# Patient Record
Sex: MALE | Race: ASIAN | NOT HISPANIC OR LATINO | Employment: OTHER | URBAN - METROPOLITAN AREA
[De-identification: names, ages, dates, MRNs, and addresses within clinical notes are randomized per-mention and may not be internally consistent; named-entity substitution may affect disease eponyms.]

---

## 2017-01-05 ENCOUNTER — HOSPITAL ENCOUNTER (EMERGENCY)
Facility: HOSPITAL | Age: 52
Discharge: HOME | End: 2017-01-05
Attending: EMERGENCY MEDICINE
Payer: COMMERCIAL

## 2017-01-05 VITALS
WEIGHT: 180 LBS | BODY MASS INDEX: 26.66 KG/M2 | RESPIRATION RATE: 16 BRPM | HEIGHT: 69 IN | HEART RATE: 68 BPM | SYSTOLIC BLOOD PRESSURE: 130 MMHG | DIASTOLIC BLOOD PRESSURE: 88 MMHG | TEMPERATURE: 98 F | OXYGEN SATURATION: 99 %

## 2017-01-05 DIAGNOSIS — S61.219A LACERATION OF FINGER, RIGHT, INITIAL ENCOUNTER: ICD-10-CM

## 2017-01-05 DIAGNOSIS — S62.609B: Primary | ICD-10-CM

## 2017-01-05 PROCEDURE — 63600175 PHARM REV CODE 636 W HCPCS: Performed by: PHYSICIAN ASSISTANT

## 2017-01-05 PROCEDURE — 90715 TDAP VACCINE 7 YRS/> IM: CPT | Performed by: PHYSICIAN ASSISTANT

## 2017-01-05 PROCEDURE — 25000003 PHARM REV CODE 250: Performed by: PHYSICIAN ASSISTANT

## 2017-01-05 PROCEDURE — 12041 INTMD RPR N-HF/GENIT 2.5CM/<: CPT

## 2017-01-05 PROCEDURE — 90471 IMMUNIZATION ADMIN: CPT | Performed by: PHYSICIAN ASSISTANT

## 2017-01-05 PROCEDURE — 99283 EMERGENCY DEPT VISIT LOW MDM: CPT | Mod: 25

## 2017-01-05 RX ORDER — LIDOCAINE HYDROCHLORIDE 10 MG/ML
10 INJECTION INFILTRATION; PERINEURAL
Status: COMPLETED | OUTPATIENT
Start: 2017-01-05 | End: 2017-01-05

## 2017-01-05 RX ORDER — AMOXICILLIN AND CLAVULANATE POTASSIUM 875; 125 MG/1; MG/1
1 TABLET, FILM COATED ORAL 2 TIMES DAILY
Qty: 20 TABLET | Refills: 0 | Status: SHIPPED | OUTPATIENT
Start: 2017-01-05 | End: 2017-01-15

## 2017-01-05 RX ORDER — DOXYCYCLINE 100 MG/1
100 CAPSULE ORAL 2 TIMES DAILY
Qty: 20 CAPSULE | Refills: 0 | Status: SHIPPED | OUTPATIENT
Start: 2017-01-05 | End: 2017-01-15

## 2017-01-05 RX ADMIN — CLOSTRIDIUM TETANI TOXOID ANTIGEN (FORMALDEHYDE INACTIVATED), CORYNEBACTERIUM DIPHTHERIAE TOXOID ANTIGEN (FORMALDEHYDE INACTIVATED), BORDETELLA PERTUSSIS TOXOID ANTIGEN (GLUTARALDEHYDE INACTIVATED), BORDETELLA PERTUSSIS FILAMENTOUS HEMAGGLUTININ ANTIGEN (FORMALDEHYDE INACTIVATED), BORDETELLA PERTUSSIS PERTACTIN ANTIGEN, AND BORDETELLA PERTUSSIS FIMBRIAE 2/3 ANTIGEN 0.5 ML: 5; 2; 2.5; 5; 3; 5 INJECTION, SUSPENSION INTRAMUSCULAR at 06:01

## 2017-01-05 RX ADMIN — LIDOCAINE HYDROCHLORIDE 10 ML: 10 INJECTION, SOLUTION INFILTRATION; PERINEURAL at 06:01

## 2017-01-05 RX ADMIN — BACITRACIN, NEOMYCIN, POLYMYXIN B 1 EACH: 400; 3.5; 5 OINTMENT TOPICAL at 06:01

## 2017-01-05 NOTE — ED TRIAGE NOTES
Motor fell on right hand and smashed very heavy motor dressing in place lacerated finger bleeding controlled hand numb

## 2017-01-05 NOTE — ED AVS SNAPSHOT
[facility]  [address] [place] [address]               Hung Keegan Colon   2017  5:37 PM   ED    Description:  Male : 1965   Department:  [facility]           Your Care was Coordinated By:     Provider Role From To    Kyung Rascon MD Attending Provider [phone] --    Sancho Washburn PA-C Physician Assistant [phone] --      Reason for Visit     Laceration           Diagnoses this Visit        Comments    Fracture of finger of right hand, open, initial encounter    -  Primary     Laceration of finger, right, initial encounter           ED Disposition     None           To Do List           Follow-up Information     Follow up with Dilia Collins III, MD. Schedule an appointment as soon as possible for a visit in 1 day.    Specialty:  Orthopedic Surgery    Why:  For further evaluation and management. Needs suture removal in 7 days.    Contact information:    [address] [place] [address]  [phone]          Go to [facility].    Specialty:  Emergency Medicine    Why:  If symptoms worsen AND suture removal in 7 days    Contact information:    [address] [place] [address] [phone]       These Medications        Disp Refills Start End    amoxicillin-clavulanate 875-125mg (AUGMENTIN) 875-125 mg per tablet 20 tablet 0 2017 1/15/2017    Take 1 tablet by mouth 2 (two) times daily. - Oral    doxycycline (VIBRAMYCIN) 100 MG Cap 20 capsule 0 2017 1/15/2017    Take 1 capsule (100 mg total) by mouth 2 (two) times daily. - Oral      [facility]     [facility] -  Assistance  Registered nurses in the [facility] provide clinical advisement, health education, appointment booking, and other advisory services.  Call for this free service at [phone].             Medications           START taking these NEW medications      "   Refills    amoxicillin-clavulanate 875-125mg (AUGMENTIN) 875-125 mg per tablet 0    Sig: Take 1 tablet by mouth 2 (two) times daily.    Class: Print    Route: Oral    doxycycline (VIBRAMYCIN) 100 MG Cap 0    Sig: Take 1 capsule (100 mg total) by mouth 2 (two) times daily.    Class: Print    Route: Oral      These medications were administered today        Dose Freq    neomycin-bacitracnZn-polymyxnB packet  ED 1 Time    Sig: Apply topically ED 1 Time.    Class: Normal    Route: Topical (Top)    Tdap vaccine injection 0.5 mL 0.5 mL Once    Sig: Inject 0.5 mLs into the muscle once.    Class: Normal    Route: Intramuscular    lidocaine HCL 10 mg/ml (1%) injection 10 mL 10 mL ED 1 Time    Sig: 10 mLs by Infiltration route ED 1 Time.    Class: Normal    Route: Infiltration           Verify that the below list of medications is an accurate representation of the medications you are currently taking.  If none reported, the list may be blank. If incorrect, please contact your healthcare provider. Carry this list with you in case of emergency.           Current Medications     amoxicillin-clavulanate 875-125mg (AUGMENTIN) 875-125 mg per tablet Take 1 tablet by mouth 2 (two) times daily.    doxycycline (VIBRAMYCIN) 100 MG Cap Take 1 capsule (100 mg total) by mouth 2 (two) times daily.           Clinical Reference Information           Your Vitals Were     BP Pulse Temp Resp Height Weight    130/88 (BP Location: Left arm, Patient Position: Sitting) 68 97.9 °F (36.6 °C) (Oral) 16 5' 9" (1.753 m) 81.6 kg (180 lb)    SpO2 BMI             99% 26.58 kg/m2         Allergies as of 1/5/2017     No Known Allergies      Immunizations Administered on Date of Encounter - 1/5/2017     Name Date Dose VIS Date Route    TDAP 1/5/2017 0.5 mL 2/24/2015 Intramuscular      ED Micro, Lab, POCT     None      ED Imaging Orders     Start Ordered       Status Ordering Provider    01/05/17 1745 01/05/17 1745  X-Ray Hand 3 view Right  1 time imaging   "    Final result       [facility] Sign-Up     Activating your [facility] account is as easy as 1-2-3!     1) Visit [email or web address], select Sign Up Now, enter this activation code and your date of birth, then select Next.  [email or web address]  Expires: 2/19/2017  6:51 PM      2) Create a username and password to use when you visit [facility] in the future and select a security question in case you lose your password and select Next.    3) Enter your e-mail address and click Sign Up!    Additional Information  If you have questions, please e-mail [email or web address] or call [phone] to talk to our [facility] staff. Remember, [facility] is NOT to be used for urgent needs. For medical emergencies, dial 911.         Smoking Cessation     If you would like to quit smoking:   You may be eligible for free services if you are a [place] resident and started smoking cigarettes before September 1, 1988.  Call the [employer]oking [employer]essation [employer] ([employer]) toll free at ([phone] [phone] or [phone] [phone].   Call -908-QUIT-NOW if you do not meet the above criteria.             [facility] complies with applicable Federal civil rights laws and does not discriminate on the basis of race, color, national origin, age, disability, or sex.        Language Assistance Services     ATTENTION: Language assistance services are available, free of charge. Please call [phone].      ATENCIÓN: Si habla español, tiene a sibley disposición servicios gratuitos de asistencia lingüística. Llame al [phone].     CHÚ Ý: N?u b?n nói Ti?ng Vi?t, có các d?ch v? h? tr? ngôn ng? mi?n phí dành cho b?n. G?i s? [phone].

## 2017-01-05 NOTE — ED PROVIDER NOTES
Encounter Date: 1/5/2017    SCRIBE #1 NOTE: I, Teri Agudelo, am scribing for, and in the presence of,  Sancho Washburn PA-C. I have scribed the following portions of the note - Other sections scribed: HPI and ROS.       History     Chief Complaint   Patient presents with    Laceration     one hr pta, reports burned and cut middle finger of right hand while working on a motor, finger jade taped and bandaged pta, rebandaged, very light bleeding noted, reports increased pain with movement of middle finger     Review of patient's allergies indicates:  No Known Allergies  HPI Comments: CC: Hand Injury    HPI: This 51 y.o. male with no medical history presents to the ED c/o a right hand injury that occurred at 1:00 pm today. Pt reports that he was on a boat working on a heavy motor, when the motor fell onto his posterior right hand, injuring the middle finger of the right hand. Pt reports experiencing pain and swelling in the middle finger, describing the symptoms as constant and moderate (6/10). He notes that he is also experiencing decreased sensation but it remains intact. Pt reports taking medication for treatment. Pt denies wrist pain. No other associated symptoms.         The history is provided by the patient. The history is limited by a language barrier (Pt speaks Georgian). A  was used.     History reviewed. No pertinent past medical history.  No past medical history pertinent negatives.  History reviewed. No pertinent past surgical history.  History reviewed. No pertinent family history.  Social History   Substance Use Topics    Smoking status: Current Some Day Smoker    Smokeless tobacco: None    Alcohol use No     Review of Systems   Constitutional: Negative for fever.   HENT: Negative for sore throat.    Eyes: Negative for visual disturbance.   Respiratory: Negative for cough and shortness of breath.    Cardiovascular: Negative for chest pain.   Gastrointestinal: Negative for  abdominal pain, nausea and vomiting.   Genitourinary: Negative for dysuria.   Musculoskeletal: Negative for back pain.        (+) right hand pain, (+) pain and swelling to the right middle finger   Skin: Negative for rash.   Neurological: Negative for weakness, numbness and headaches.       Physical Exam   Initial Vitals   BP Pulse Resp Temp SpO2   01/05/17 1702 01/05/17 1702 01/05/17 1702 01/05/17 1702 01/05/17 1702   130/88 68 16 97.9 °F (36.6 °C) 99 %     Physical Exam    Nursing note and vitals reviewed.  Constitutional: He appears well-developed and well-nourished. He is not diaphoretic. No distress.   HENT:   Head: Normocephalic and atraumatic.   Nose: Nose normal.   Eyes: Conjunctivae and EOM are normal. Right eye exhibits no discharge. Left eye exhibits no discharge.   Neck: Normal range of motion. No tracheal deviation present. No JVD present.   Cardiovascular: Normal rate, regular rhythm and normal heart sounds. Exam reveals no friction rub.    No murmur heard.  Pulmonary/Chest: Breath sounds normal. No stridor. No respiratory distress. He has no wheezes. He has no rhonchi. He has no rales. He exhibits no tenderness.   Musculoskeletal: He exhibits tenderness (TTP over R 3rd proximal phalanx. ).   Able to range MCP, but not the PIP or DIP joints secondary to pain. Swelling noted to R 3rd MCP. 0.5cm laceration overlying the R 4th PIP joint without active bleeding. 0.5cm overlying superficial laceration with slow active bleed. No evidence of foreign body. Sensation intact. Capillary refill less than 2 seconds and radial pulse 2+.    Neurological: He is alert and oriented to person, place, and time.   Skin: Skin is warm and dry. No rash and no abscess noted. No erythema. No pallor.         ED Course   Lac Repair  Date/Time: 1/5/2017 6:53 PM  Performed by: SAHLEY HINOJOSA  Authorized by: DARIANA SEGUNDO   Consent Done: Yes  Consent: Verbal consent obtained. Written consent not obtained.  Location: R 3rd  digit.  Wound length (cm): 0.5cm.  Foreign bodies: no foreign bodies  Tendon involvement: none  Nerve involvement: none  Vascular damage: no  Anesthesia: digital block    Anesthesia:  Anesthesia: digital block  Local Anesthetic: lidocaine 1% without epinephrine   Anesthetic total: 6 mL  Patient sedated: no  Preparation: Patient was prepped and draped in the usual sterile fashion.  Irrigation solution: saline  Irrigation method: jet lavage  Amount of cleaning: extensive  Debridement: none  Degree of undermining: none  Skin closure: 4-0 nylon  Number of sutures: 1  Technique: simple  Approximation: close  Approximation difficulty: simple  Dressing: antibiotic ointment, dressing applied and splint  Patient tolerance: Patient tolerated the procedure well with no immediate complications    Lac Repair  Date/Time: 1/5/2017 6:55 PM  Performed by: ASHLEY HINOJOSA  Authorized by: DARIANA SEGUNDO   Consent Done: Yes  Consent: Verbal consent obtained. Written consent not obtained.  Location: R 4th digit.  Wound length (cm): 0.5cm.  Foreign bodies: no foreign bodies  Tendon involvement: none  Nerve involvement: none  Vascular damage: no  Anesthesia: local infiltration    Anesthesia:  Anesthesia: local infiltration  Local Anesthetic: lidocaine 1% without epinephrine   Anesthetic total: 3 mL  Patient sedated: no  Preparation: Patient was prepped and draped in the usual sterile fashion.  Irrigation solution: saline  Irrigation method: jet lavage  Amount of cleaning: extensive  Debridement: none  Degree of undermining: none  Skin closure: 4-0 nylon  Number of sutures: 2  Approximation: close  Approximation difficulty: simple  Dressing: antibiotic ointment and dressing applied  Patient tolerance: Patient tolerated the procedure well with no immediate complications        Labs Reviewed - No data to display       X-Rays:   Independently Interpreted Readings:   Other Readings:  Proximal phalanx fracture of R 3rd digit    Medical  Decision Making:   History:   Old Medical Records: I decided to obtain old medical records.      This is an emergent evaluation of a 51 y.o. male with no PMHx presenting to the ED for pain to the R middle finger. Denies fever, nausea, vomiting, numbness, tingling. Vitals WNL, afebrile. Patient is non-toxic appearing and in no acute distress. 2 small lacerations to digits with no evidence of retained foreign body. Pulses 2+ and capillary refill <2 seconds. Limited ROM of R middle digit secondary to pain. Sensation intact and equal bilaterally. Xray reveals comminuted fracture of R middle proximal phalanx without dislocation.    Laceration will require closure to achieve and maintain hemostasis and to promote optimal wound healing after the wound is copiously irrigated at high pressure.Tetanus status is updated. Dressed with antibiotic ointment and non-adherent dressing. Splint applied. Discharged with Augmentin and doxycycline and instructed to follow up with PCP for reevaluation and suture removal in 7 days.       I discussed with the patient the diagnosis, treatment plan, indications for return to the emergency department, and for expected follow-up. The patient verbalized an understanding. The patient is asked if there are any questions or concerns. We discuss the case, until all issues are addressed to the patients satisfaction. Patient understands and is agreeable to the plan.     I discussed this patient with Dr. Rascon who is in agreement with my assessment and plan.           Scribe Attestation:   Scribe #1: I performed the above scribed service and the documentation accurately describes the services I performed. I attest to the accuracy of the note.    Attending Attestation:           Physician Attestation for Scribe:  Physician Attestation Statement for Scribe #1: I, Sancho Washburn PA-C, reviewed documentation, as scribed by Teri Agudelo in my presence, and it is both accurate and complete.                  ED Course     Clinical Impression:   The primary encounter diagnosis was Fracture of finger of right hand, open, initial encounter. A diagnosis of Laceration of finger, right, initial encounter was also pertinent to this visit.    Disposition:   Disposition: Discharged  Condition: Stable       Sancho Washburn PA-C  01/05/17 5403

## 2017-01-06 NOTE — ED NOTES
Sterile dressing applied to patients wounds on third and forth digit, bleeding controlled, patient tolerated well.